# Patient Record
Sex: MALE | Race: OTHER
[De-identification: names, ages, dates, MRNs, and addresses within clinical notes are randomized per-mention and may not be internally consistent; named-entity substitution may affect disease eponyms.]

---

## 2017-04-30 ENCOUNTER — HOSPITAL ENCOUNTER (EMERGENCY)
Dept: HOSPITAL 80 - FED | Age: 32
Discharge: HOME | End: 2017-04-30
Payer: COMMERCIAL

## 2017-04-30 VITALS — HEART RATE: 84 BPM | DIASTOLIC BLOOD PRESSURE: 84 MMHG | RESPIRATION RATE: 18 BRPM | SYSTOLIC BLOOD PRESSURE: 132 MMHG

## 2017-04-30 VITALS — OXYGEN SATURATION: 95 % | TEMPERATURE: 97.9 F
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
## 2017-04-30 DIAGNOSIS — S61.411A: ICD-10-CM

## 2017-04-30 DIAGNOSIS — S01.511A: ICD-10-CM
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
## 2017-04-30 DIAGNOSIS — S02.2XXA: Primary | ICD-10-CM

## 2017-04-30 DIAGNOSIS — Z23: ICD-10-CM

## 2017-04-30 PROCEDURE — 0HQGXZZ REPAIR LEFT HAND SKIN, EXTERNAL APPROACH: ICD-10-PCS

## 2017-04-30 PROCEDURE — 3E0234Z INTRODUCTION OF SERUM, TOXOID AND VACCINE INTO MUSCLE, PERCUTANEOUS APPROACH: ICD-10-PCS

## 2017-04-30 PROCEDURE — 0CQ0XZZ REPAIR UPPER LIP, EXTERNAL APPROACH: ICD-10-PCS
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
## 2017-09-23 ENCOUNTER — HOSPITAL ENCOUNTER (EMERGENCY)
Dept: HOSPITAL 80 - CED | Age: 32
Discharge: HOME | End: 2017-09-23
Payer: COMMERCIAL

## 2017-09-23 VITALS — TEMPERATURE: 99.1 F | OXYGEN SATURATION: 94 % | RESPIRATION RATE: 18 BRPM

## 2017-09-23 VITALS — SYSTOLIC BLOOD PRESSURE: 118 MMHG | HEART RATE: 88 BPM | DIASTOLIC BLOOD PRESSURE: 76 MMHG

## 2017-09-23 DIAGNOSIS — E86.9: ICD-10-CM

## 2017-09-23 DIAGNOSIS — R10.11: Primary | ICD-10-CM

## 2017-09-23 LAB
% IMMATURE GRANULYOCYTES: 0.3 % (ref 0–1.1)
ABSOLUTE IMMATURE GRANULOCYTES: 0.03 10^3/UL (ref 0–0.1)
ABSOLUTE NRBC COUNT: 0 10^3/UL (ref 0–0.01)
ADD DIFF?: NO
ADD MORPH?: NO
ADD SCAN?: NO
ALBUMIN SERPL-MCNC: 4.2 G/DL (ref 3.5–5)
ALP SERPL-CCNC: 61 IU/L (ref 38–126)
ALT SERPL-CCNC: 48 IU/L (ref 21–72)
ANION GAP SERPL CALC-SCNC: 11 MEQ/L (ref 8–16)
AST SERPL-CCNC: 26 IU/L (ref 17–59)
ATYPICAL LYMPHOCYTE FLAG: 0 (ref 0–99)
BILIRUB SERPL-MCNC: 1.5 MG/DL (ref 0.1–1.4)
CALCIUM SERPL-MCNC: 9.1 MG/DL (ref 8.5–10.4)
CHLORIDE SERPL-SCNC: 102 MEQ/L (ref 97–110)
CO2 SERPL-SCNC: 25 MEQ/L (ref 22–31)
COLOR UR: YELLOW
CREAT SERPL-MCNC: 0.9 MG/DL (ref 0.7–1.3)
ERYTHROCYTE [DISTWIDTH] IN BLOOD BY AUTOMATED COUNT: 13.1 % (ref 11.5–15.2)
FRAGMENT RBC FLAG: 0 (ref 0–99)
GFR SERPL CREATININE-BSD FRML MDRD: > 60 ML/MIN/{1.73_M2}
GLUCOSE SERPL-MCNC: 97 MG/DL (ref 70–100)
HCT VFR BLD CALC: 44.7 % (ref 40–51)
HGB BLD-MCNC: 15.9 G/DL (ref 13.7–17.5)
LEFT SHIFT FLG: 0 (ref 0–99)
LIPEMIA HEMOLYSIS FLAG: 90 (ref 0–99)
MCH RBC BLDCO QN: 32.4 PG (ref 27.9–34.1)
MCHC RBC AUTO-ENTMCNC: 35.6 G/DL (ref 32.4–36.7)
MCV RBC AUTO: 91.2 FL (ref 81.5–99.8)
NITRITE UR QL STRIP: NEGATIVE
NRBC-AUTO%: 0 % (ref 0–0.2)
PH UR STRIP: 8 [PH] (ref 5–7.5)
PLATELET # BLD: 225 10^3/UL (ref 150–400)
PLATELET CLUMPS FLAG: 10 (ref 0–99)
PMV BLD AUTO: 8.8 FL (ref 8.7–11.7)
POTASSIUM SERPL-SCNC: 4.2 MEQ/L (ref 3.5–5.2)
PROT SERPL-MCNC: 6.7 G/DL (ref 6.3–8.2)
RBC # BLD AUTO: 4.9 10^6/UL (ref 4.4–6.38)
SODIUM SERPL-SCNC: 138 MEQ/L (ref 134–144)
SP GR UR STRIP: 1.01 (ref 1–1.03)

## 2017-09-23 NOTE — CPEKG
Heart Rate: 75

RR Interval: 800

P-R Interval: 152

QRSD Interval: 102

QT Interval: 352

QTC Interval: 394

P Axis: 67

QRS Axis: 9

T Wave Axis: 21

EKG Severity - NORMAL ECG -

EKG Impression: SINUS RHYTHM

Electronically Signed By: Rno Frank 23-Sep-2017 14:50:47
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
## 2018-07-07 ENCOUNTER — HOSPITAL ENCOUNTER (INPATIENT)
Dept: HOSPITAL 80 - CED | Age: 33
LOS: 1 days | Discharge: HOME | DRG: 440 | End: 2018-07-08
Attending: HOSPITALIST | Admitting: FAMILY MEDICINE
Payer: COMMERCIAL

## 2018-07-07 DIAGNOSIS — Z79.52: ICD-10-CM

## 2018-07-07 DIAGNOSIS — R12: ICD-10-CM

## 2018-07-07 DIAGNOSIS — Z87.820: ICD-10-CM

## 2018-07-07 DIAGNOSIS — K85.90: Primary | ICD-10-CM

## 2018-07-07 DIAGNOSIS — N28.1: ICD-10-CM

## 2018-07-07 DIAGNOSIS — Z79.899: ICD-10-CM

## 2018-07-07 NOTE — EDPHY
H & P





<Gerry Molina - Last Filed: 07/08/18 07:35>


Smoking Status: Never smoked





<Slava Jeffery - Last Filed: 07/09/18 15:34>


Time Seen by Provider: 07/07/18 21:38


HPI/ROS: 





Chief complaint.  Abdominal pain





HPI.  32-year-old male presents with upper abdominal pain for 1 day.  He tells 

me it is worse over the past 2-3 hours.  Vomiting x2.  Diarrhea this morning.  

No urinary symptoms.  The pain is in the epigastric area and radiates through 

to his back.  He describes it as sharp and aching.  Denies chest discomfort or 

shortness of breath or fever.  No similar symptoms previously.  No history of 

peptic ulcer disease though he does tell me he has frequent heartburn not this 

severe.  Drinks alcohol only 2-3 beers per week without increase recently.





ROS


Constitutional.  no fever/chills, no weakness


Eyes.  no problems with vision


ENT.  no sore throat, no nasal drainage


Cardiovascular.  no chest pain


Respiratory.  no shortness of breath, no cough


Abdominal.  Epigastric abdominal pain with vomiting


.  no problems urinating


MS.  no calf pain/swelling, no neck/back pain, no joint pain


Skin.  no rash


Lymph.  no swollen glands


Neuro.  no headache, no dizziness, no difficulty walking or with speech (Slava Jeffery)


Past Medical/Surgical History: 





Concussion, knee surgery (Slava Jeffery)


Social History: 





, nonsmoker, no alcohol (Slava Jeffery)


Physical Exam: 





General Appearance:  Alert well-developed male moderate distress vital signs 

are stable


Eyes: Pupils equal and round no pallor or injection.


ENT, Mouth:  Mucous membranes are moist.


Respiratory:  There are no retractions, lungs are clear to auscultation.


Cardiovascular: Regular rate and rhythm.


Gastrointestinal:  Abdomen is soft with tenderness to palpation in the 

epigastrium.  Normal bowel sounds.  No masses.


Neurological:  Awake and alert, sensory and motor exams grossly normal.


Skin: Warm and dry, no rashes.


Musculoskeletal:  Neck is supple nontender.


Extremities  symmetrical, full range of motion.


Psychiatric: Patient is oriented X 3, there is no agitation. (Slava Jeffery)


Constitutional: 


 Initial Vital Signs











Temperature (C)  36.6 C   07/07/18 21:31


 


Heart Rate  62   07/07/18 21:31


 


Respiratory Rate  18   07/07/18 21:31


 


Blood Pressure  149/101 H  07/07/18 21:31


 


O2 Sat (%)  97   07/07/18 21:31








 











O2 Delivery Mode               Room Air














Allergies/Adverse Reactions: 


 





No Known Allergies Allergy (Unverified 09/23/17 12:58)


 








Home Medications: 














 Medication  Instructions  Recorded


 


Acetaminophen [Tylenol 325mg (*)] 650 mg PO Q4HRS PRN  tab 07/08/18


 


Multivitamins [Multivitamin (*)] 1 each PO DAILY 07/08/18


 


Ondansetron Odt [Zofran Odt 4 mg 4 mg PO Q4 PRN #20 tab 07/08/18





(*)]  


 


oxyCODONE IR [Oxycodone Ir (*)] 5 - 10 mg PO Q4 PRN #10 tab 07/08/18














Medical Decision Making





- Diagnostics


Imaging: Discussed imaging studies w/ On call Radiologist





<Gerry Molina - Last Filed: 07/08/18 07:35>





<Slava Jeffery - Last Filed: 07/09/18 15:34>


Procedures: 





IV normal saline.  Morphine for pain.  Zofran for nausea.





GI cocktail (Slava Jeffery)


ED Course/Re-evaluation: 





Re-evaluation 10:20 p.m..  No significant change in discomfort with the GI 

cocktail.





Symptoms are somewhat better with the morphine and Zofran





Point of care CBC shows 10.4 white blood cell count with hemoglobin 16.1 

hematocrit 48.5





Complete metabolic panel is normal with normal LFTs





Lipase pending as it has be sent to [facility] (Slava Jeffery)


Differential Diagnosis: 





Differential diagnosis includes, but is not limited to: 


Gastroenteritis, dehydration, hepatitis, pancreatitis, renal colic, kidney 

stones, ureterolithiasis, cholecystitis, gastritis, mesenteric adenitis, food 

poisoning, esophagitis.


 (Gerry Molina)


Other Provider: 





Care assumed at change of shift.


D/w off going physician.  Chart reviewed.  Pt interviewed and examined.





To summarize, this is a previously healthy 32-year-old male with no prior 

abdominal surgery.  He awoke at 10:00 a.m. This morning with sense of mild 

distress and discomfort in the epigastrium.  This got worse and worse through 

the course of the day to the point where at about 5:00 p.m., after a workout at 

the local gym consisting of weight training, the pain was worse and at that 

point started radiating through to the back.  Pain itself is not worse with 

position or swallowing nor was it worse when he ate his evening meal-he was not 

hungry but he just ate cause he thought he needed to.  He describes the pain as 

being moderately severe associated with when episodes of nonbloody emesis at 

approximately 6:00 p.m..  Further there was an episode of diarrhea earlier 

approximately 1:00 p.m..  This too was nonbloody, nor black.





He is on no prescription medications however does take treating and also other 

oral supplements as part of his weight training program.  He has lost 

approximately 20 lb over the last 3 months due to dietary restrictions.  

However it has only been 1-2 lb a week.





The pain itself is sharp and achy, persistent, not better with the evening meal.





Prior to my arrival he has had the following:


Normal CBC the white count is approximately 10


Normal comp panel


Pending lipase


CT scan of the abdomen performed but results pending.





On my exam the patient reports that the pain was moderately better after the 

morphine but is now reporting that he needs additional medicines.  Of note he 

is 6 ft 3, all muscle at 240 lb and has hadd 6 mg currently and will have 6 

more.  He is hemodynamically stable without tachycardia.  His mucous membranes 

are slightly dry.





Abdomen:  Bowel sounds are present.  Abdomen is scaphoid.  Soft.  No 

organomegaly.  He is moderately tender in the epigastrium.  No masses are felt.

  No rebound or guarding.





The lipase came back at 20,000.





This CT scan was reviewed by me on the PACS system and the findings per the 

radiologist are as follows:


Prominence of the pancreatic head compatible with pancreatitis.  There is 

calcification in the pancreatic head which does not appear to be a stone though 

the study is suboptimal due to timing of contrast.  There is no signs of ductal 

dilatation.





A call was placed to the hospitalist service at [facility] and case was 

reviewed with Dr. Freed.  Patient accepted for admission.  I discussed with the 

patient, and recommended going by ambulanc: however he prefers to go by private 

car.  He was ambulated in the department after the dose of morphine to be 

certain he was safe to go.  He will be driven by his sister.


 (Gerry Molina)


Care Turn Over: 





Care to Dr. Molina at 11:00 p.m. (Slava Jeffery)





- Data Points


Laboratory Results: 


 











  07/07/18





  21:50


 


POC Sodium  138 mEq/L mEq/L





   (135-145) 


 


POC Potassium  3.9 mEq/L mEq/L





   (3.3-5.0) 


 


POC Chloride  101.0 mEq/L mEq/L





   () 


 


POC Total CO2  26 mEq/L mEq/L





   (22-31) 


 


POC BUN  22 mg/dL mg/dL





   (7-23) 


 


POC Creatinine  1.2 mg/dL mg/dL





   (0.7-1.3) 


 


POC Glucose  97 mg/dL mg/dL





   () 


 


POC Calcium  9.3 mg/dL mg/dL





   (8.5-10.4) 


 


POC Total Bilirubin  0.9 mg/dL mg/dL





   (0.1-1.4) 


 


POC AST  57 IU/L IU/L





   (17-59) 


 


POC ALT  56 IU/L IU/L





   (21-72) 


 


POC Alk Phosphatase  49 IU/L IU/L





   () 


 


POC Total Protein  6.7 g/dL g/dL





   (6.3-8.2) 


 


POC Albumin  4.1 g/dL g/dL





   (3.5-5.0) 











Medications Given: 


 








Discontinued Medications





Al Hydroxide/Mg Hydroxide (Maalox Susp)  30 ml PO ONCE ONE


   Stop: 07/07/18 21:50


   Last Admin: 07/07/18 21:58 Dose:  30 ml


Enoxaparin Sodium (Lovenox)  40 mg SC DAILY DUNIA


   Stop: 01/04/19 08:59


   Last Admin: 07/08/18 09:46 Dose:  40 mg


Hydromorphone HCl (Dilaudid)  0.5 - 1 mg IVP Q3HRS PRN


   PRN Reason: Pain, Severe Unable to Take PO


   Stop: 07/18/18 00:33


   Last Admin: 07/08/18 00:48 Dose:  0.5 mg


Hydromorphone HCl (Dilaudid)  0.5 - 1 mg IVP Q2HRS PRN


   PRN Reason: Pain, Severe Unable to Take PO


   Stop: 07/18/18 00:33


   Last Admin: 07/08/18 03:06 Dose:  1 mg


Sodium Chloride (Ns)  1,000 mls @ 0 mls/hr IV EDNOW ONE; Wide Open


   PRN Reason: Protocol


   Stop: 07/07/18 21:50


   Last Admin: 07/07/18 21:57 Dose:  1,000 mls


Sodium Chloride (Ns)  1,000 mls @ 150 mls/hr IV CONT DUNIA


   Stop: 01/04/19 00:44


   Last Admin: 07/08/18 14:14 Dose:  1,000 mls


Lidocaine (Lidocaine 2% Viscous)  15 ml PO ONCE ONE


   Stop: 07/07/18 21:50


   Last Admin: 07/07/18 21:58 Dose:  15 ml


Lorazepam (Ativan Injection)  0.5 - 1 mg IVP Q6HRS PRN


   PRN Reason: Anxiety, Unable to Take PO


   Stop: 01/04/19 00:33


   Last Admin: 07/08/18 00:49 Dose:  0.5 mg


Morphine Sulfate (Morphine)  6 mg IVP EDNOW ONE


   Stop: 07/07/18 21:50


   Last Admin: 07/07/18 22:06 Dose:  Not Given


Morphine Sulfate (Morphine)  6 mg IVP EDNOW ONE


   Stop: 07/07/18 22:01


   Last Admin: 07/07/18 22:06 Dose:  6 mg


Morphine Sulfate (Morphine)  6 mg IVP EDNOW ONE


   Stop: 07/08/18 00:11


   Last Admin: 07/08/18 00:12 Dose:  6 mg


Ondansetron HCl (Zofran)  4 mg IVP EDNOW ONE


   Stop: 07/07/18 21:50


   Last Admin: 07/07/18 21:59 Dose:  4 mg





Point of Care Test Results: 


 CBC











CBC Collection Date            07/07/18


 


CBC Collection Time            21:40


 


WBC                            10.4


 


RBC                            5.05


 


HGB                            16.1


 


HCT                            48.5


 


PLT                            268


 


Neut #                         8.2


 


Neut                           78.3


 


LYMPH #                        1.7


 


LYMPH                          16.5


 


Other WBC #                    0.5


 


Other WBC                      5.2


 


MCV                            96.0











 Chemistry











  07/07/18





  21:50


 


POC Sodium  138 mEq/L mEq/L





   (135-145) 


 


POC Potassium  3.9 mEq/L mEq/L





   (3.3-5.0) 


 


POC Chloride  101.0 mEq/L mEq/L





   () 


 


POC Total CO2  26 mEq/L mEq/L





   (22-31) 


 


POC BUN  22 mg/dL mg/dL





   (7-23) 


 


POC Creatinine  1.2 mg/dL mg/dL





   (0.7-1.3) 


 


POC Glucose  97 mg/dL mg/dL





   () 


 


POC Calcium  9.3 mg/dL mg/dL





   (8.5-10.4) 


 


POC Total Bilirubin  0.9 mg/dL mg/dL





   (0.1-1.4) 


 


POC AST  57 IU/L IU/L





   (17-59) 


 


POC ALT  56 IU/L IU/L





   (21-72) 


 


POC Alk Phosphatase  49 IU/L IU/L





   () 


 


POC Total Protein  6.7 g/dL g/dL





   (6.3-8.2) 


 


POC Albumin  4.1 g/dL g/dL





   (3.5-5.0) 














Departure





<Gerry Molina - Last Filed: 07/08/18 07:35>





<Slava Jeffery - Last Filed: 07/09/18 15:34>





- Departure


Disposition: [facility] Inpatient Acute


Clinical Impression: 


Pancreatitis


Qualifiers:


 Chronicity: acute Pancreatitis type: unspecified pancreatitis type Acute 

pancreatitis complication: no infection or necrosis Qualified Code(s): K85.90 - 

Acute pancreatitis without necrosis or infection, unspecified





Condition: Good

## 2018-07-08 VITALS — SYSTOLIC BLOOD PRESSURE: 119 MMHG | DIASTOLIC BLOOD PRESSURE: 79 MMHG

## 2018-07-08 RX ADMIN — SODIUM CHLORIDE SCH MLS: 900 INJECTION, SOLUTION INTRAVENOUS at 14:14

## 2018-07-08 RX ADMIN — SODIUM CHLORIDE SCH MLS: 900 INJECTION, SOLUTION INTRAVENOUS at 07:31

## 2018-07-08 RX ADMIN — SODIUM CHLORIDE SCH MLS: 900 INJECTION, SOLUTION INTRAVENOUS at 00:50

## 2018-07-08 NOTE — PDMN
Medical Necessity


Medical necessity: MCG M250 Pancreatitis, 2 days.  31 y/o w/ severe acute 

pancreatitis.  Lipase >29281, Pt NPO.  Ongoing IV fluids and IV opioids for 

pain management required.  Hypertensive.  Anticipate >2MN for ongoing 

monitoring and treatment.

## 2018-07-08 NOTE — PDDCSUM
Discharge Summary


Discharge Summary: 


DISCHARGE SUMMARY





FOLLOW-UP ITEMS: 


1. Reassess whether to permanently discontinue testosterone and other 

supplements with primary care provider


2. Consider outpatient gastroenterology referral if pancreatitis recurs





DATE OF ADMISSION:  7/7/2018


DATE OF DISCHARGE:  7/8/2018





DISCHARGE DIAGNOSES:


1.  Acute idiopathic pancreatitis





CONSULTATIONS:


None





PROCEDURES / IMAGING:


CT of the abdomen demonstrating peripancreatic edema, calcification in the head


Renal ultrasound demonstrating simple cyst





CHIEF COMPLAINT:


Acute abdominal pain





SUBJECTIVE:


Patient's abdominal pain has significantly improved, he is tolerating a solid 

diet





PHYSICAL EXAM ON DISCHARGE:


Systolic blood pressure is 110, heart rate 50-70, afebrile overnight, satting 

on room air, alert awake oriented x3, bulky muscular build, abdomen is soft, 

minimally tender in the mid epigastric area without any rebound or guarding, 

bowel sounds are present, acromegaly present





LABS ON DISCHARGE:


Initial lipase greater than 20,000, liver panel unremarkable, creatinine 0.9, 

white blood cell count [id number]





HOSPITAL COURSE BY PROBLEM:


The patient presented with acute abdominal pain secondary to acute pancreatitis 

as evidenced by a serum lipase level of greater than 20,000, midepigastric pain

, peripancreatic edema and calcification on CT imaging.  He was treated with 

aggressive IV fluids, placed on bowel rest, and received IV pain and nausea 

medications.  His clinical symptoms resolved much more rapidly than originally 

anticipated, and on 7/8/2018, the patient was feeling hungry and we were able 

to advance his diet to clear liquids in the morning, low-fat solids in the 

afternoon.  The patient tolerated this diet advancement, and he was safely 

discharged home with a prescription for some oral oxycodone and Zofran as 

needed if he experiences any recurrence of symptoms.  The remaining question is 

what caused the patient's acute pancreatitis, as the patient does not consume 

alcohol regularly and he had not consumed any alcohol within the past 2 weeks 

prior to this presentation.  He also not recently consumed particularly fatty 

meal, and the patient's diet is relatively low fat and lean.  It is possible 

that the patient's testosterone or muscle building supplements caused his 

condition and I have recommended that the patient hold off on administering 

these medications tomorrow, until he sees his primary care provider for 

additional guidance.  It seems that the patient is taking the supplementations 

for muscle building, and it is unclear whether he has a true need for 

testosterone supplementation, as the patient has not recently had any levels 

checked and he has been on this medication for quite some time.





DISCHARGE MEDICATIONS:


Please see official discharge medication reconciliation sheet in chart , 

oxycodone as needed 10 tabs prescribed, Zofran as needed 20 tabs prescribed.  

Hold home testosterone and other muscle building supplements.





DISCHARGE INSTRUCTIONS:


Please follow up with primary care provider this week, and get a 

gastroenterology referral if pancreatitis reoccurs.





TIME SPENT:


Greater than 30 minutes were spent on direct patient care, as well as discharge 

planning and preparation. 





The patient was discharged earlier than originally anticipated by the admitting 

provider, as the patient's clinical condition experienced unanticipated, rapid 

recovery secondary to highly physician expeditious care by the patient's 

nursing staff and hospitalist provider.

## 2018-07-08 NOTE — GHP
[f rep st]



                                                            HISTORY AND PHYSICAL





DATE OF ADMISSION:  07/08/2018



SOURCE:  Patient provides history, appears reliable.  EMR was reviewed and case discussed with the ED
 provider before arrival from [facility].



CHIEF COMPLAINT:  Epigastric pain.



HISTORY OF PRESENT ILLNESS:  This is a pleasant 32-year-old gentleman with no significant past medica
l history who presents to the emergency department today with complaints of sudden onset of epigastri
c pain starting approximately 10 a.m.  The patient reports that he woke up from sleep due to severe p
ain with radiation to his back.  Patient states that he has never had any symptoms similar to this pr
eviously.  He has had intermittent episodes of gastroesophageal reflux which has been treated appropr
iately over-the-counter with Zantac relieving his symptoms.  He states that when he develops increasi
ng acid, he does vomit and feels relief with this as well.  He denies any hematemesis.  Today, patien
t thought that he might be having a little constipation, so he tried to take some stool softeners and
 subsequently developed some loose stools.  Denies any melena or hematochezia.  He has had decreased 
appetite all day, but he last ate approximately 4 p.m.  His pain did not significantly change.  He de
scribes it as sharp, radiating to the back and epigastric in location.  The patient reports that he d
id present to the emergency department last year with right upper quadrant abdominal pain.  He states
 that he underwent imaging studies with x-rays and thought perhaps he had a mild case of pancreatitis
 versus gallbladder issues, but has not had any concerns or pain in the right upper quadrant since th
at time.  The patient denies any abdominal distention.  In the emergency department patient did recei
ve Dilaudid with minimal relief of his abdominal pain.  Since arrival to the floor, patient did arriv
e by private vehicle in transfer and received a dose of Ativan with improvement of symptoms from 10/1
0 pain down to 4.



REVIEW OF SYSTEMS:  A 10-point review of systems is reviewed and negative except as noted above.



ALLERGIES:  No known drug allergies.



HOME MEDICATIONS:  Patient reports that he is followed by a physician.  He has been receiving anaboli
c steroids by prescription, per his report, with weekly injections of Trenbolone and drostanolone.  A
dditionally, patient is on numerous over-the-counter supplements including milk thistle, stinging net
tle root, fish oil, vitamin D; multiple whey protein supplementations with Vitamin B, creatinine, fis
h oil, L-arginine, L-carnitine and whey protein.  The patient additionally has been prescribed phente
rmine.  He has taken over-the-counter ephedra supplements and caffeine.



PAST MEDICAL HISTORY:  Negative.



PAST SURGICAL HISTORY:  Right meniscus repair.



FAMILY HISTORY:  Grandmother with diabetes type 2.



SOCIAL HISTORY:  Patient is employed with 2 jobs.  He goes to the gym 2 or more times daily.  He does
 not smoke, drink, or do drugs.



CODE STATUS:  Full.



PHYSICAL EXAMINATION:  VITAL SIGNS:  Upon arrival at [facility] at approximately 9 p.m.
, blood pressure 149/101, heart rate 62, respiratory rate 18, O2 sat is 97% on room air, temperature 
36.6.  Current vital signs on the medical floor:  Blood pressure 145/81, heart rate 69, respiratory r
ate 18, O2 saturation 96% on room air with temperature 37.2.  GENERAL:  Patient does appear fatigued.
  He is cradling his upper abdomen.  He does not appear in any acute distress.  HEAD:  Normocephalic,
 atraumatic.  EYES:  Extraocular muscles are grossly intact.  Pupils equal, round, with decreased alpesh
ctivity to light bilaterally, but symmetric.  No scleral icterus or conjunctival injection.  ENT:  Mu
cous membranes appear moist.  Some minimal oropharyngeal erythema but no exudates.  No nasal discharg
e.  NECK:  Supple.  Trachea midline.  CV:  Regular rate and rhythm.  No murmurs, rubs, or gallops ender
reciated.  RESPIRATORY:  Unlabored breathing.  LUNGS:  Clear to auscultation bilaterally.  No wheezes
, rales, or rhonchi appreciated.  ABDOMEN:  Positive bowel sounds, soft.  Patient with tenderness to 
palpation in the epigastric region.  Negative Mccoy sign.  No rebound, guarding, or distention noted
.  Positive bowel sounds.  :  No suprapubic tenderness to palpation.  No Hammond catheter in place.  
EXTREMITIES:  Patient moves all extremities.  Sits up independently with some minimal discomfort due 
to abdominal pain.  NEURO:  Grossly nonfocal.  No facial drooping.  PSYCH:  Patient awake, alert, and
 oriented x3.  He is not agitated.  Not anxious.  Thought process, content and questions are all appr
opriate.



LABORATORY STUDIES:  WBCs 10.4, H and H is 16.1 and 48.5, platelet count 268, MCV 96.0.  LFTs were re
ported to be within normal limits.  Lipase was greater than 20,000.



IMAGING:  CT abdomen and pelvis image report reviewed noting the pancreas with enlargement, heterogen
eous attenuation.  Punctate calcification centrally within the pancreatic head.  Minimal edema in the
 peripancreatic fat.  No free fluid or pseudocyst formation.  There is a 1 cm mid pole left kidney, l
ikely cyst, with __________ for ultrasound verification.



ASSESSMENT AND PLAN:  Pleasant 32-year-old gentleman with a significant past medical history who is r
eceiving anabolic steroid injections and presents to the emergency department today with acute onset 
of epigastric abdominal pain.

1.  Acute pancreatitis.  CT significant for evidence of acute pancreatitis with some calcifications a
lso noted.  Question if patient had an episode of pancreatitis previously.  Patient is on multiple he
rbal supplements, as well as anabolic steroid injections, which potentially could put patient at risk
 for pancreatitis.  There is no evidence of stone on CT or obstruction.  The contrast phase did limit
 evaluation of the pancreatic duct, however, given that he does have also a renal cyst noted incident
alan, we will plan to just obtain a complete abdominal ultrasound for further evaluation.  Patient wi
ll be made n.p.o.  He will receive IV fluids as well as IV pain management.  We will track his lipase
 in the morning and complete additional further review of all patient's supplements and medications a
nd try to have him eliminate these in the future.

2.  Nausea and vomiting currently controlled.  Zofran and Phenergan available p.r.n.

3.  Intractable abdominal pain.  Patient's pain has improved after dosing of Dilaudid and Ativan.  He
 continues to have some level of abdominal pain but is much improved.  Will continue IV and provide b
owel rest as noted above and plan.

4.  FEN:  IV fluids while patient is n.p.o.  Electrolyte monitoring and replacement if needed.

5.  Prophylaxis:  SCDs, Lovenox, as anticipate patient will be here greater than 2 days.

6.  COR status:  Full.

7.  Disposition:  The patient admitted to inpatient status on the medical floor given the severity of
 pancreatitis.  Anticipate greater than 2-midnight stay.





Job #:  [id number]

## 2018-07-08 NOTE — ASMTCMCOM
CM Note

 

CM Note                       

Notes:

32yr old male admitted for abdominal pain, pancreatitis. Found to take multiple herbs and uses 

steroid injections. Lives in [place]. Not anticipating that patient will have discharge needs.

 

Date Signed:  07/08/2018 01:37 PM

Electronically Signed By:Vandana King LCSW